# Patient Record
Sex: MALE | Race: WHITE | NOT HISPANIC OR LATINO | Employment: FULL TIME | URBAN - METROPOLITAN AREA
[De-identification: names, ages, dates, MRNs, and addresses within clinical notes are randomized per-mention and may not be internally consistent; named-entity substitution may affect disease eponyms.]

---

## 2017-01-16 ENCOUNTER — APPOINTMENT (OUTPATIENT)
Dept: BEHAVIORAL HEALTH | Facility: PHYSICIAN GROUP | Age: 27
End: 2017-01-16
Payer: COMMERCIAL

## 2017-01-18 ENCOUNTER — NON-PROVIDER VISIT (OUTPATIENT)
Dept: BEHAVIORAL HEALTH | Facility: PHYSICIAN GROUP | Age: 27
End: 2017-01-18
Payer: COMMERCIAL

## 2017-01-18 NOTE — BH THERAPY
[facility]  INITIAL ASSESSMENT    Name: Rosalino Valenzuela  MRN: [id number]  : 1990  Age: 26 y.o.  Date of assessment: 2017  PCP: Hipolito Mireles M.D.  Persons in attendance: Patient    CHIEF COMPLAINT AND HISTORY OF PRESENTING PROBLEM:  (as stated by Patient):  Rosalino Valenzuela is a 26 y.o., White male referred for assessment by No ref. provider found.  Primary presenting issue includes wife telling him she wants a divorce, unwilling to get couples counseling.   FAMILY/SOCIAL HISTORY  Current living situation/household members: Lives with wife and dog.  Avoiding wife as much as possible.  Relevant family history/structure/dynamics:  for 2 years, after two year relationship. Stresses included working different shifts, one or both going back to school, so not much time together. He thought they were interested in the same things (especially spending time outdoors) and supported each other to go to school, but wife reportedly blames him for all their problems, for not being affectionate enough, following through enough, etc..Patient is younger of two children, with close relationship with his parents (who  9 years ago) and sister, all in [place].  Family emotionally supportive but does not believe in divorce.  Current family/social stressors: Still sharing home with wife, on receiving end of negative, somewhat abusive comments about him and his family.  They are in the process of completing divorce papers, which they plan to file this week, but wife wants to wait to sell house until July (when she may have saved more money for alternate home).  She reportedly has threatened to try to take his dog from him.  At times he has slept in his truck to avoid her.   Quality/quantity of current family and/or social support: Several close friends here, but he doesn't want to burden them.  Talks with parents.  Does patient/parent report a family history of behavioral health issues, diagnoses, or  treatment? No  No family history on file.     BEHAVIORAL HEALTH TREATMENT HISTORY  Does patient/parent report a history of prior behavioral health treatment for patient? Yes:    Dates Level of Care Facilty/Provider Diagnosis/Problem Medications   [facility] [place], [place] Reaction to parents' divorce                                                                         History of untreated behavioral health issues identified? No    MEDICAL HISTORY  Primary care behavioral health screenings: Health QuePatient stionaire                                     If depressive symptoms identified deferred to follow up visit unless specifically addressed in assesment and plan.    Interpretation of PHQ-9 Total Score   Score Severity   1-4 Minimal Depression   5-9 Mild Depression   10-14 Moderate Depression   15-19 Moderately Severe Depression   20-27 Severe Depression     Past medical/surgical history:   No past medical history on file.   No past surgical history on file.     Medication Allergies:  Sulfa drugs     Patient reports last physical exam: November  Does patient/parent report any history of or current developmental concerns? No  Does patient/parent report nutritional concerns? No  Does patient/parent report change in appetite or weight loss/gain? No  Does patient/parent report history of eating disorder symptoms? No  Does patient/parent report dental problem? No  Does patient/parent report physical pain? No   Indicate if pain is acute or chronic, and location: N/A   Pain scale rating:       Does patient/parent report functional impact of medical, developmental, or pain issues?   No    EDUCATIONAL  Is patient currently enrolled in a school/educational program?   No:   Highest grade level completed: 13th.  Plans to return to school in April to do internship to be a  for power company.  School performance/functioning: Good  History of Special Education/repeated grades/learning issues: no  Preferred  learning style: Doing, any  Current learning needs (large print, language barrier, etc):  No special learning needs.    EMPLOYMENT/RESOURCES  Is the patient currently employed? Yes  Does the patient/parent report adequate financial resources? Yes  Does patient identify impact of presenting issue on work functioning? No  Work or income-related stressors:  At times finances tight.  Wife working demanding job as , with varying hours.     HISTORY:  Does patient report current or past enlistment? No    [If yes, trigger below section]  Does patient report history of exposure to combat? No  Does patient report history of  sexual trauma? No  Does patient report other -related stressors? N/A    SPIRITUAL/CULTURAL/IDENTITY:  What are the patient’s/family’s spiritual beliefs or practices? Raised as a Orthodoxy, going to [employer] weekly.  Now prays, no formal [employer] attendance.  What is the patient’s cultural or ethnic background/identity? Raised Orthodoxy, with strong beliefs.  How does the patient identify their sexual orientation? Heterosexual  How does the patient identify their gender? Male  Does the patient identify any spiritual/cultural/identity factors as relevant to the presenting issue? No    LEGAL HISTORY  Has the patient ever been involved with juvenile, adult, or family legal systems? No   [If yes, trigger section below:]  Does patient report ever being a victim of a crime?  No  Does patient report involvement in any current legal issues?  No  Does patient report ever being arrested or committing a crime? No  Does patient report any current agency (parole/probation/CPS/) involvement? No    ABUSE/NEGLECT/TRAUMA SCREENING  Does patient report feeling “unsafe” in his/her home, or afraid of anyone? No  Does patient report any history of physical, sexual, or emotional abuse? No  Does parent or significant other report any of the above? N/A  Is there evidence of neglect by  self? No  Is there evidence of neglect by a caregiver? N/A  Does the patient/parent report any history of CPS/APS/police involvement related to suspected abuse/neglect or domestic violence? No  Does the patient/parent report any other history of potentially traumatic life events? No  Based on the information provided during the current assessment, is a mandated report of suspected abuse/neglect being made?  No     SAFETY ASSESSMENT - SELF  Does patient acknowledge current or past symptoms of dangerousness to self? No  Does parent/significant other report patient has current or past symptoms of dangerousness to self? No      Recent change in frequency/specificity/intensity of suicidal thoughts or self-harm behavior? No  Current access to firearms, medications, or other identified means of suicide/self-harm? N/A  If yes, willing to restrict access to means of suicide/self-harm? N/A  Protective factors present: Future-oriented, Good impulse control, Hopefulness, Moral objection to suicide, Positive coping skills, Spiritual beliefs/practices and Strong family connections    Current Suicide Risk: Low  Crisis Safety Plan completed and copy given to patient: N/A    SAFETY ASSESSMENT - OTHERS  Does paor past symptoms of aggressive behavior or risk to others? No  Does parent/significant othtient acknowledge current or past symptoms of aggressive behavior or risk to others? No  Does parent/significant other report patient has current or past symptoms of aggressive behavior or risk to others? No    Recent change in frequency/specificity/intensity of thoughts or threats to harm others? No  Current access to firearms/other identified means of harm? N/A.  Has hunting weapon, but no intent to harm self or other people.  If yes, willing to restrict access to weapons/means of harm? N/A  Protective factors present: Good frustration tolerance, Moral/spiritual prohibition, Positive impulse-control, Stable employment, Low  rumination/obsession and Actively engaged in treatment    Current Homicide Risk:  Low  Crisis Safety Plan completed and copy given to patient? N/A  Based on information provided during the current assessment, is a mandated “duty to warn” being exercised? No    SUBSTANCE USE/ADDICTION HISTORY  [] Not applicable - patient 10 years of age or younger    Is there a family history of substance use/addiction? No  Does patient acknowledge or parent/significant other report use of/dependence on substances? No  Last time patient used alcohol: last weekend Within the past week? Yes  Last time patient used marijuana: *Never**  Within the past month? No  Any other street drugs ever tried even once? No  Any use of prescription medications/pills without a prescription, or for reasons others than originally prescribed?  No  Any other addictive behavior reported (gambling, shopping, sex)? No     Drug History: None  Amphetamine:      Cannibis:      Cocaine:      Ecstasy:      Hallucinogen:      Inhalant:       Opiate:      Other:      Sedative:           What consequences does the patient associate with any of the above substance use and or addictive behaviors? None    Patient’s motivation/readiness for change: Has never thought about suicide and has never been violent.  Looking to see how he can be more effective in relationships.    [x] Patient denies use of any substance/addictive behaviors    STRENGTHS/ASSETS  Strengths Identified by interviewer: Evidence of good judgement, Family suppport, Social support, Cognitive flexibility, Social skills and History of effective treatment  Strengths Identified by patient: Responsible, willing to get help and learn to compromise.    MENTAL STATUS/OBSERVATIONS   Participation: Active verbal participation, Attentive, Engaged and Open to feedback  Grooming: Good  Orientation:Alert and Fully Oriented   Behavior: Calm  Eye contact: Good   Mood:Depressed and Anxious (slightly)  Affect:Flexible,  "Full range, Congruent with content and Sad  Thought process: Logical and Goal-directed  Thought content:  Within normal limits  Speech: Rate within normal limits and Volume within normal limits  Perception: Within normal limits  Memory: No gross evidence of memory deficits  Insight: Adequate  Judgment:  Good  Other:    Family/couple interaction observations: N/A    RESULTS OF SCREENING MEASURES:  [] Not applicable  Measure:   Score:     Measure:   Score:       CLINICAL FORMULATION: Patient is a 26-year old   man, employed as a primary care manager in the [employer], who referred himself for counseling after wife announced she wanted a divorce.  He initially hoped they would see a counselor together, to try to preserve their marriage and resolve their differences, but wife wouldn't go to the appointments he set and refused to schedule an appointment with someone else.  He indicates there was no abuse or substance abuse in the marriage, but she accused him of not being affectionate enough, not being available enough (e.g., going to bed when she went to bed), not following through on \"promises\".  He admits he has had a habit of agreeing to what others want--saying \"yes\" when he might prefer to say \"no\"--and then not consistently following through.  He also reports she became increasingly possessive and critical, and he no longer wants to try to salvage the marriage. He previously found counseling helpful: to have a safe place to talk about feelings and to look at his behavior.  He wants help so that he can be more effective in future relationships.  He has been working out to deal with the stress; has been responsible about showing up for work and doing his job; reports healthful eating.  He has always loved the outdoors and is now planning to go back to school, so he can get a job as a  with the power company, either here or in [place].      DIAGNOSTIC IMPRESSION(S):  Adjustment Disorder, " mixed anxiety & depressed mood      IDENTIFIED NEEDS/PLAN:  [If any of these marked, trigger DISPOSITION list]  Mood/anxiety and Family/Couples conflict  Refer to [facility]: Outpatient Therapy    Does patient express agreement with the above plan? Yes     Referral appointment(s) scheduled? Yes     OUMAR Andino

## 2023-02-28 ENCOUNTER — OFFICE VISIT (OUTPATIENT)
Dept: URGENT CARE | Facility: CLINIC | Age: 33
End: 2023-02-28

## 2023-02-28 VITALS
BODY MASS INDEX: 33.18 KG/M2 | OXYGEN SATURATION: 95 % | RESPIRATION RATE: 16 BRPM | HEART RATE: 86 BPM | HEIGHT: 69 IN | WEIGHT: 224 LBS | DIASTOLIC BLOOD PRESSURE: 88 MMHG | TEMPERATURE: 97.7 F | SYSTOLIC BLOOD PRESSURE: 136 MMHG

## 2023-02-28 DIAGNOSIS — Z02.89 ENCOUNTER FOR EXAMINATION REQUIRED BY DEPARTMENT OF TRANSPORTATION (DOT): ICD-10-CM

## 2023-02-28 PROCEDURE — 7100 PR DOT PHYSICAL: Performed by: PHYSICIAN ASSISTANT

## 2023-02-28 NOTE — PROGRESS NOTES
32 y.o. male comes in for a DOT physical. No major medical history, no chronic conditions, no chronic medications. History of left leg and hip surgery > 10 years ago with no residual issues. No history of asthma, heart disease, murmurs, seizure disorder or syncopal episodes with activity.  Please see the chart for further information, however normal physical exam, cleared for DOT for 2 years without restrictions.    Tangela Spears P.A.-C.